# Patient Record
Sex: FEMALE | Race: WHITE
[De-identification: names, ages, dates, MRNs, and addresses within clinical notes are randomized per-mention and may not be internally consistent; named-entity substitution may affect disease eponyms.]

---

## 2021-10-06 ENCOUNTER — HOSPITAL ENCOUNTER (OUTPATIENT)
Dept: HOSPITAL 63 - SURG | Age: 66
Discharge: HOME | End: 2021-10-06
Attending: ANESTHESIOLOGY
Payer: COMMERCIAL

## 2021-10-06 VITALS — DIASTOLIC BLOOD PRESSURE: 96 MMHG | SYSTOLIC BLOOD PRESSURE: 174 MMHG

## 2021-10-06 DIAGNOSIS — Z88.8: ICD-10-CM

## 2021-10-06 DIAGNOSIS — Z90.710: ICD-10-CM

## 2021-10-06 DIAGNOSIS — E66.01: ICD-10-CM

## 2021-10-06 DIAGNOSIS — I10: ICD-10-CM

## 2021-10-06 DIAGNOSIS — M54.16: Primary | ICD-10-CM

## 2021-10-06 DIAGNOSIS — Z79.82: ICD-10-CM

## 2021-10-06 DIAGNOSIS — M48.061: ICD-10-CM

## 2021-10-06 DIAGNOSIS — Z71.82: ICD-10-CM

## 2021-10-06 DIAGNOSIS — Z98.890: ICD-10-CM

## 2021-10-06 DIAGNOSIS — Z79.899: ICD-10-CM

## 2021-10-06 PROCEDURE — G0463 HOSPITAL OUTPT CLINIC VISIT: HCPCS

## 2021-10-06 PROCEDURE — 99214 OFFICE O/P EST MOD 30 MIN: CPT

## 2021-10-20 ENCOUNTER — HOSPITAL ENCOUNTER (OUTPATIENT)
Dept: HOSPITAL 63 - SURG | Age: 66
Discharge: HOME | End: 2021-10-20
Attending: ANESTHESIOLOGY
Payer: COMMERCIAL

## 2021-10-20 VITALS — DIASTOLIC BLOOD PRESSURE: 84 MMHG | SYSTOLIC BLOOD PRESSURE: 182 MMHG

## 2021-10-20 DIAGNOSIS — Z79.899: ICD-10-CM

## 2021-10-20 DIAGNOSIS — Z79.82: ICD-10-CM

## 2021-10-20 DIAGNOSIS — Z88.8: ICD-10-CM

## 2021-10-20 DIAGNOSIS — M54.16: Primary | ICD-10-CM

## 2021-10-20 PROCEDURE — 64483 NJX AA&/STRD TFRM EPI L/S 1: CPT

## 2021-11-04 ENCOUNTER — HOSPITAL ENCOUNTER (OUTPATIENT)
Dept: HOSPITAL 63 - SURG | Age: 66
Discharge: HOME | End: 2021-11-04
Attending: ANESTHESIOLOGY
Payer: COMMERCIAL

## 2021-11-04 VITALS — DIASTOLIC BLOOD PRESSURE: 75 MMHG | SYSTOLIC BLOOD PRESSURE: 139 MMHG

## 2021-11-04 DIAGNOSIS — Z79.899: ICD-10-CM

## 2021-11-04 DIAGNOSIS — M19.90: ICD-10-CM

## 2021-11-04 DIAGNOSIS — Z98.890: ICD-10-CM

## 2021-11-04 DIAGNOSIS — E66.01: ICD-10-CM

## 2021-11-04 DIAGNOSIS — M54.59: Primary | ICD-10-CM

## 2021-11-04 DIAGNOSIS — Z90.710: ICD-10-CM

## 2021-11-04 DIAGNOSIS — Z90.49: ICD-10-CM

## 2021-11-04 DIAGNOSIS — M48.061: ICD-10-CM

## 2021-11-04 PROCEDURE — G0463 HOSPITAL OUTPT CLINIC VISIT: HCPCS

## 2021-11-04 PROCEDURE — 99214 OFFICE O/P EST MOD 30 MIN: CPT

## 2021-11-23 ENCOUNTER — HOSPITAL ENCOUNTER (OUTPATIENT)
Dept: HOSPITAL 61 - KCIC | Age: 66
Discharge: HOME | End: 2021-11-23
Attending: NEUROLOGICAL SURGERY
Payer: COMMERCIAL

## 2021-11-23 DIAGNOSIS — M25.78: ICD-10-CM

## 2021-11-23 DIAGNOSIS — M48.061: Primary | ICD-10-CM

## 2021-11-23 DIAGNOSIS — M41.86: ICD-10-CM

## 2021-11-23 DIAGNOSIS — Z88.8: ICD-10-CM

## 2021-11-23 DIAGNOSIS — Z79.84: ICD-10-CM

## 2021-11-23 DIAGNOSIS — Z79.899: ICD-10-CM

## 2021-11-23 DIAGNOSIS — M47.26: ICD-10-CM

## 2021-11-23 PROCEDURE — 62304 MYELOGRAPHY LUMBAR INJECTION: CPT

## 2021-11-23 PROCEDURE — 72132 CT LUMBAR SPINE W/DYE: CPT

## 2021-11-23 NOTE — [facility]
EXAM: Fluoroscopic guided lumbar puncture for CT myelography; lumbar spine CT myelogram.



HISTORY: Lumbar stenosis. Left greater than right lower extremity radiculopathy..



TECHNIQUE: The risks of the procedure discussed with the patient and written and consent was obtained
.. Timeout was performed. Fluoroscopic imaging of the lumbar spine was performed and a site overlying
 the L2-L3 was selected for needle entry. The skin in this location was sterilely prepped, draped and
 infiltrated with 1 percent lidocaine. A spinal needle was advanced into the thecal sac spinal needle
 was advanced into the thecal sac. 12 cc of Omnipaque 180 intrathecal contrast was injected. The need
le was removed and a sterile bandage was placed at the needle entry site. Prone and standing upright 
neutral, flexion and extension fluoroscopic images were obtained. The patient was then transferred to
 the CT suite for the post injection CT portion of the exam. The patient was discharged in stable con
dition without immediate complication. 3 fluoroscopic spot images were obtained for a total fluorosco
py time 39 seconds.

 

*One or more of the following individualized dose reduction techniques were utilized for this examina
tion:  

1. Automated exposure control.  

2. Adjustment of the mA and/or kV according to patient size.  

3. Use of iterative reconstruction technique.



COMPARISON: None.



FINDINGS: There is mild lumbar dextroscoliosis centered at L3. There is a 1 mL retrolisthesis of L2 o
n L3 and 3 mm retrolisthesis of L3 on L4 and L4 and L5. There is degenerative endplate remodeling and
 osteophytosis primarily at L3-L4 and L4-L5. There is disc space narrowing predominantly along the ri
ght aspect of L3-L4 and bilaterally at all 4 to L5. There is vacuum phenomenon at these levels. There
 is also vacuum phenomenon anteriorly at L1-L2. There are multiple endplate Schmorl's nodes. There is
 suspected bone demineralization. There is no acute or subacute fracture. There is no suspicious osse
ous lesion. The right kidney is absent. There is aortobiiliac calcified atherosclerotic plaque. There
 is mild vacuum phenomenon involving the sacroiliac joints. The conus terminates at the inferior aspe
ct of L1.



At L1-L2, there is right anterior predominant endplate remodeling. There is mild-to-moderate right an
d mild left facet arthropathy. There is no stenosis.



At L2-L3, there is a disc bulge and anterior predominant endplate remodeling. There is mild to modera
te right and mild left facet arthropathy. There are partial laminectomy changes. There is slight retr
olisthesis. There is no stenosis.



At L3-L4, there is a broad-based left lateral recess to foraminal disc protrusion and slight inferior
 extrusion. There is also a shallow broad-based posterior central disc protrusion and slight inferior
 extrusion. These are superimposed on a disc bulge and endplate remodeling. There is moderate to shon
re bilateral facet arthropathy. There are partial laminectomy changes. There is mild retrolisthesis. 
There is mild right and severe left foraminal stenosis. There is mild to moderate central canal steno
sis.



At L4-L5, there are left greater than right lateral recess to extraforaminal disc protrusions and sli
ght superior extrusions with osteophyte complexes. These are superimposed on a disc bulge and endplat
e remodeling. There is mild right facet arthropathy. There is mild retrolisthesis. There is moderate 
right and severe left foraminal stenosis. There is moderate to severe central canal stenosis.



At L5-S1, there is a disc bulge and endplate remodeling. There is moderate bilateral facet arthropath
y. There is no stenosis.



IMPRESSION:

1. Multilevel degenerative change involving the lumbar spine, described in detail above. This results
 in mild right and severe left foraminal and mild to moderate central canal stenosis at L3-L4 and mod
erate right and severe left foraminal and moderate to severe central canal stenosis at L4-L5.

2. Lumbar scoliosis and slight listhesis at the aforementioned levels.

3. Partial laminectomy changes at L2-L3 and L3-L4.



Electronically signed by: Aracely Norris MD (11/23/2021 2:02 PM) [id number]

## 2021-11-23 NOTE — [facility]
EXAM: Fluoroscopic guided lumbar puncture for CT myelography; lumbar spine CT myelogram.



HISTORY: Lumbar stenosis. Left greater than right lower extremity radiculopathy..



TECHNIQUE: The risks of the procedure discussed with the patient and written and consent was obtained
.. Timeout was performed. Fluoroscopic imaging of the lumbar spine was performed and a site overlying
 the L2-L3 was selected for needle entry. The skin in this location was sterilely prepped, draped and
 infiltrated with 1 percent lidocaine. A spinal needle was advanced into the thecal sac spinal needle
 was advanced into the thecal sac. 12 cc of Omnipaque 180 intrathecal contrast was injected. The need
le was removed and a sterile bandage was placed at the needle entry site. Prone and standing upright 
neutral, flexion and extension fluoroscopic images were obtained. The patient was then transferred to
 the CT suite for the post injection CT portion of the exam. The patient was discharged in stable con
dition without immediate complication. 3 fluoroscopic spot images were obtained for a total fluorosco
py time 39 seconds.

 

*One or more of the following individualized dose reduction techniques were utilized for this examina
tion:  

1. Automated exposure control.  

2. Adjustment of the mA and/or kV according to patient size.  

3. Use of iterative reconstruction technique.



COMPARISON: None.



FINDINGS: There is mild lumbar dextroscoliosis centered at L3. There is a 1 mL retrolisthesis of L2 o
n L3 and 3 mm retrolisthesis of L3 on L4 and L4 and L5. There is degenerative endplate remodeling and
 osteophytosis primarily at L3-L4 and L4-L5. There is disc space narrowing predominantly along the ri
ght aspect of L3-L4 and bilaterally at all 4 to L5. There is vacuum phenomenon at these levels. There
 is also vacuum phenomenon anteriorly at L1-L2. There are multiple endplate Schmorl's nodes. There is
 suspected bone demineralization. There is no acute or subacute fracture. There is no suspicious osse
ous lesion. The right kidney is absent. There is aortobiiliac calcified atherosclerotic plaque. There
 is mild vacuum phenomenon involving the sacroiliac joints. The conus terminates at the inferior aspe
ct of L1.



At L1-L2, there is right anterior predominant endplate remodeling. There is mild-to-moderate right an
d mild left facet arthropathy. There is no stenosis.



At L2-L3, there is a disc bulge and anterior predominant endplate remodeling. There is mild to modera
te right and mild left facet arthropathy. There are partial laminectomy changes. There is slight retr
olisthesis. There is no stenosis.



At L3-L4, there is a broad-based left lateral recess to foraminal disc protrusion and slight inferior
 extrusion. There is also a shallow broad-based posterior central disc protrusion and slight inferior
 extrusion. These are superimposed on a disc bulge and endplate remodeling. There is moderate to shon
re bilateral facet arthropathy. There are partial laminectomy changes. There is mild retrolisthesis. 
There is mild right and severe left foraminal stenosis. There is mild to moderate central canal steno
sis.



At L4-L5, there are left greater than right lateral recess to extraforaminal disc protrusions and sli
ght superior extrusions with osteophyte complexes. These are superimposed on a disc bulge and endplat
e remodeling. There is mild right facet arthropathy. There is mild retrolisthesis. There is moderate 
right and severe left foraminal stenosis. There is moderate to severe central canal stenosis.



At L5-S1, there is a disc bulge and endplate remodeling. There is moderate bilateral facet arthropath
y. There is no stenosis.



IMPRESSION:

1. Multilevel degenerative change involving the lumbar spine, described in detail above. This results
 in mild right and severe left foraminal and mild to moderate central canal stenosis at L3-L4 and mod
erate right and severe left foraminal and moderate to severe central canal stenosis at L4-L5.

2. Lumbar scoliosis and slight listhesis at the aforementioned levels.

3. Partial laminectomy changes at L2-L3 and L3-L4.



Electronically signed by: Aracely Norris MD (11/23/2021 2:02 PM) [id number]

## 2021-12-14 ENCOUNTER — HOSPITAL ENCOUNTER (OUTPATIENT)
Dept: HOSPITAL 61 - PNCL | Age: 66
Discharge: HOME | End: 2021-12-14
Attending: ANESTHESIOLOGY
Payer: COMMERCIAL

## 2021-12-14 DIAGNOSIS — Z87.891: ICD-10-CM

## 2021-12-14 DIAGNOSIS — E11.9: ICD-10-CM

## 2021-12-14 DIAGNOSIS — Z79.82: ICD-10-CM

## 2021-12-14 DIAGNOSIS — M79.604: ICD-10-CM

## 2021-12-14 DIAGNOSIS — Z83.3: ICD-10-CM

## 2021-12-14 DIAGNOSIS — Z88.8: ICD-10-CM

## 2021-12-14 DIAGNOSIS — Z79.84: ICD-10-CM

## 2021-12-14 DIAGNOSIS — Z79.899: ICD-10-CM

## 2021-12-14 DIAGNOSIS — Z90.49: ICD-10-CM

## 2021-12-14 DIAGNOSIS — Z82.49: ICD-10-CM

## 2021-12-14 DIAGNOSIS — M54.50: Primary | ICD-10-CM

## 2021-12-14 DIAGNOSIS — Z98.890: ICD-10-CM

## 2021-12-14 DIAGNOSIS — M19.90: ICD-10-CM

## 2021-12-14 DIAGNOSIS — E66.9: ICD-10-CM

## 2021-12-14 DIAGNOSIS — Z90.710: ICD-10-CM

## 2021-12-14 PROCEDURE — G0463 HOSPITAL OUTPT CLINIC VISIT: HCPCS

## 2021-12-14 NOTE — PDOC1
INITIAL PAIN CONSULT


DATE OF SERVICE:


DOS:


DATE: 12/14/21 


TIME: 12:49





CHIEF COMPLAINT:


Chief Complaint:


Low back and left greater than right lower extremity pain





HISTORY OF PRESENT ILLNESS:


65-year-old female presents history of pain low back bilateral lower extremities

left greater than right for about 1 year not result of any specific injury or 

accident but slowly worsening over the year with standing walking sitting for 

longer than 10 to 15 minutes patient reports pain is waking her from sleep at 

night occasionally but not every night does not affect her bowel bladder control

but does affect her ability to walk she occasionally uses a walker but does not 

have one with her today.  Patient has had previous lumbar surgery with 

decompression at L2-3 and L3-4 in 1995 with good resolution of pain at that time

but the pain over the past year is returned significant patient is recently seen

her neurosurgeon which is not recommending any surgery at this time.  Patient 

tried Excedrin as well as hydrocodone both of which decrease the pain about 20 

to 30% also had physical therapy in June of this year which is not helpful and 

has had at one epidural injection in October of this year at an outside facility

which was not helpful by her report as well.  Patient rates her disability 

rating 0-10 10 being the worst is a 10 in all categories except for life support

activities and sleeping which is a 7 self-care sexual history occupation social 

related recreation family activities are all rated at 10/10./Patient did have an

MRI scan and a CT myelogram showing multilevel degenerative change with mild 

right and severe left foraminal and mild to moderate central canal stenosis at L

3-4 moderate right and severe left foraminal and moderate to severe central 

canal stenosis at L4-5 lumbar scoliosis and slight listhesis at the 

aforementioned levels with partial laminectomy changes L2-3 and at L3-4.  

Patient describes the pain as constant sharp stabbing radiating down the lower 

extremities mostly the posterior gluteus posterior lateral thigh on the left 

greater than right but present bilaterally with some numbness and tingling on 

the left lateral thigh as well.  Patient reports no loss of motor function but 

significant fatigability of the lower extremities.





PAST MEDICAL HISTORY:


PMH:


Dizziness, arthritis, diabetes, cigarette smoking





PREVIOUS SURGERIES:


Past Surgical Hx:


Hysterectomy, lumbar laminectomy, cholecystectomy, nephrectomy





CURRENT MEDICATIONS:


Current Meds:





Active Scripts








 Medications  Dose


 Route/Sig


 Max Daily Dose Days Date Category


 


 Gabapentin **


  (Gabapentin) 400


 Mg Capsule  400 Mg


 PO HS


   12/14/21 Reported


 


 Gabapentin **


  (Gabapentin) 100


 Mg Capsule  200 Mg


 PO DAILY


   12/14/21 Reported


 


 Meloxicam 7.5 Mg


 Tablet  7.5 Mg


 PO BID


   12/14/21 Reported


 


 Excedrin Migraine


 Geltab


  (Aspirin/Acetaminophen/Caffeine)


 1 Each Tablet  1 Each


 PO QID


   12/14/21 Reported


 


 Metformin Hcl 500


 Mg Tablet  500 Mg


 PO BIDWMEALS


   11/23/21 Reported


 


 Omeprazole 20 Mg


 Capsule.dr  1 Cap


 PO DAILY


   11/23/21 Reported


 


 Mirapex


  (Pramipexole


 Di-Hcl) 0.25 Mg


 Tablet  1 Tab


 PO QHS


   11/23/21 Reported











ALLERGIES;


Allergies:  


Coded Allergies:  


     meperidine (Verified  Allergy, Unknown, 11/23/21)





FAMILY HISTORY:


Family Hx:


Alzheimer's disease, diabetes, hypertension





SOCIAL HISTORY:


Social Hx:


Patient does not drink alcohol and smokes less than half pack cigarettes and has

for 45 years continues to smoke, does not use any illegal illicit or 

recreational drugs is single lives locally in [facility]





REVIEW OF SYSTEMS:


ROS:


Positive for those items mentioned in history of present illness, all systems 

are reviewed, otherwise negative ,and are complete full and well-documented on 

patient's chart.





PHYSICAL EXAM:


VS:


Blood pressure is 142/95 pulse 111 respirations 18 temperature 98.1 F height is

5 foot 3 inches weight is 328 pounds


PE:


PHYSICAL EXAMINATION:





GENERAL: The patient is awake, alert, oriented, appropriate, very pleasant in 

demeanor


HEENT: Shows normocephalic, atraumatic.  Extraocular movements are intact and 

symmetrical.  Oral cavity: Mucous membranes moist and pink.  Dentition is 

intact.


NECK: Shows anterior throat supple without palpable lymphadenopathy noted.  

Swallow reflex symmetrical.


CHEST: Shows normal on inspection.  Breath sounds are clear bilaterally, distant

but no rales or rhonchi bilaterally.


HEART: Shows S1, S2 clear.  No murmurs auscultated.


ABDOMEN: Soft, nontender, nondistended, obese.  No palpable organomegaly is 

noted.


BACK: Shows spine grossly in the midline.  Normal-appearing cervical lordotic 

curvature.  There is slightly increased thoracic kyphosis, some flattening of 

the lumbar lordotic curvature, with well-healed surgical scarring noted.  Lumbar

paraspinous muscles show symmetrical on inspection, on palpation shows some 

moderate tenderness diffusely throughout the upper, middle and lower 

distribution of the paraspinous muscles bilaterally and also into the lower 

thoracic paraspinous musculature, firm and tender, but without specific trigger 

points, without radiation of pain.  The patient has good rotational motion of 

the lumbar spine, both laterally as well as extension and flexion with mild 

tenderness with extension and forward flexion as well as right and left lateral 

rotation.  No tenderness over the spinous processes, sacrum or sacroiliac 

regions.


EXTREMITIES: Lower extremities show deep tendon reflexes 1+ in the patellar and 

tendo calcaneus tendons.  Motor exam is 5 on a scale of 5 with right 

dorsiflexion, extension, quadriceps and hamstring flexion and 4/5 on the left.  

Peripheral pulses are 1+ posterior tibial.  No peripheral edema is noted 

bilaterally.  Lower extremities are warm and dry to touch, equal in color and 

appearance.  Patient is able to stand but has difficulty trying to stand on her 

toes walks with a slight favoring gait does appear to favor the left lower 

extremity but not use any assistive devices with her on her visit today.


SKIN: Shows warm and dry, good turgor.  No edema.  No sores, rashes or bruising 

throughout.





IMPRESSION:


Impression:


65-year-old female with approximate 1 year history increasing pain low back left

greater than right lower extremity radicular fashion status post physical 

therapy as well as interventional techniques without significant improvement.


CT myelogram as noted


Obesity


Diabetes


Arthritis


Cigarette smoking





Plan: Options were discussed with patient including continued physical therapies

interventional techniques and medication management.  Patient would like to 

pursue a most conservative course we will order physical therapy with traction 

for the lumbar spine also discussed water therapy following this if not 

significantly reducing the pain.  Patient was given information regarding spinal

cord stimulation as well.  Patient will follow up after physical therapy.











JANIE ROCKWELL MD               Dec 14, 2021 12:55

## 2022-01-31 ENCOUNTER — HOSPITAL ENCOUNTER (OUTPATIENT)
Dept: HOSPITAL 61 - PNCL | Age: 67
Discharge: HOME | End: 2022-01-31
Attending: ANESTHESIOLOGY
Payer: COMMERCIAL

## 2022-01-31 DIAGNOSIS — M96.1: ICD-10-CM

## 2022-01-31 DIAGNOSIS — M48.061: ICD-10-CM

## 2022-01-31 DIAGNOSIS — Z79.84: ICD-10-CM

## 2022-01-31 DIAGNOSIS — M51.16: Primary | ICD-10-CM

## 2022-01-31 DIAGNOSIS — Z79.899: ICD-10-CM

## 2022-01-31 DIAGNOSIS — Z79.82: ICD-10-CM

## 2022-01-31 PROCEDURE — G0463 HOSPITAL OUTPT CLINIC VISIT: HCPCS

## 2022-01-31 PROCEDURE — 99212 OFFICE O/P EST SF 10 MIN: CPT

## 2022-01-31 NOTE — PDOC
Progress Note - Pain Clinic


Date of Service:


DOS:


DATE: 1/31/22 


TIME: 12:46





Diagnosis:


Dx:


Lumbar radiculopathy with lumbar degenerative disease and lumbar spinal stenosis

with lumbar postlaminectomy syndrome





History or Present Illness:


HPI:


66-year-old female returns for follow-up status post physical therapy which she 

is completed now with traction reports improvement by about 50% she feels with 

physical therapy although is beginning to return now that therapy is ended the 

traction was helpful in decreasing pain that was getting into her left lower 

extremity primarily still has some in that area but more is just in the low back

now and into the legs occasionally patient reports is an 8 on scale 10 is worst 

average and a 6 at its least and is a 6 today.  Patient reports worse with 

walking standing change positions once again with radiating pain more on the 

left leg but less pronounced than prior to the traction.  Patient reports is a 

sharp pain in the back radiating can be constant and severe with walking 

standing or standing for prolonged periods greater than 15 to 20 minutes.  

Patient reports is better with sitting or lying down but is still waking her 

from sleep about every 4-6 hours.  Patient reports she has been doing household 

activities with greater ease and comfort since the traction was performed but 

again the pain is still fairly significant.  Patient reports no bowel or bladder

incontinence.





Physical Exam:


VS:


Blood pressures 142/84 pulse 107 respirations 18 temperature 98.3 F weight is 

329 pounds.


PE:


PHYSICAL EXAMINATION:





GENERAL: The patient is awake, alert, oriented, appropriate, very pleasant in 

demeanor


HEENT: Shows normocephalic, atraumatic.  Extraocular movements are intact and 

symmetrical.  Oral cavity: Mucous membranes moist and pink.  Dentition is 

intact.


NECK: Shows anterior throat supple without palpable lymphadenopathy noted.  

Swallow reflex symmetrical.


CHEST: Shows normal on inspection.  Breath sounds are clear bilaterally, distant

but no rales or rhonchi.


HEART: Shows S1, S2 clear.  No murmurs auscultated.


ABDOMEN: Soft, nontender, nondistended.  No palpable organomegaly is noted.  


BACK: Shows spine grossly in the midline.  Normal-appearing cervical lordotic 

curvature.  There is slightly increased thoracic kyphosis, some minor flattening

of the lumbar lordotic curvature.  Lumbar paraspinous muscles show symmetrical 

on inspection, on palpation shows some moderate tenderness diffusely throughout 

the upper, middle and lower distribution of the paraspinous muscles, but without

specific trigger points, without radiation of pain.  The patient has good 

rotational motion of the lumbar spine, both laterally as well as extension and 

flexion without significant difficulty.  No tenderness over the spinous 

processes, has some moderate tenderness on the left side over the posterior 

superior iliac spine and the superior aspect of the sacroiliac joint but without

radiation, right side is nontender.


EXTREMITIES: Lower extremities show deep tendon reflexes 1+ in the patellar and 

tendo calcaneus tendons.  Motor exam is 5 on a scale of 5 with right 

dorsiflexion, extension, quadriceps and hamstring flexion and 4/5 on the left.  

Peripheral pulses are 1 posterior tibial.  No peripheral edema is noted 

bilaterally.  Lower extremities are warm and dry.


SKIN: Shows warm and dry, good turgor.  No edema.  No sores, rashes or bruising 

throughout.





Procedure:


Procedure:


Options were discussed with the patient.  Patient's old chart was reviewed as 

her current medication regimen updated current review of systems updated today 

as well.  We will enroll patient for water therapy and patient has a community 

center near her home which is very affordable and has been through some water 

programs there in the distant past and would like to utilize this for water 

aerobics, and conditioning.  We discussed that the patient will follow up in 

approximate 4 weeks after water therapy is completed also encouraged pressure 

and trigger massage to the left lumbar paraspinous musculature as well as the 

sacroiliac region and showed patient some stretching and strengthening exercises

to do to specifically focus on this area of the back.  Patient will follow up on

some water therapy is completed.





Medication Injected:


Med Injected:


None





Condition at Discharge:


Condition at Discharge:


Condition at discharge is stable.











JANIE ROCKWELL MD               Jan 31, 2022 12:50

## 2022-04-20 ENCOUNTER — HOSPITAL ENCOUNTER (OUTPATIENT)
Dept: HOSPITAL 61 - PNCL | Age: 67
Discharge: HOME | End: 2022-04-20
Attending: ANESTHESIOLOGY
Payer: COMMERCIAL

## 2022-04-20 DIAGNOSIS — Z79.82: ICD-10-CM

## 2022-04-20 DIAGNOSIS — M51.16: Primary | ICD-10-CM

## 2022-04-20 DIAGNOSIS — Z88.8: ICD-10-CM

## 2022-04-20 DIAGNOSIS — Z79.899: ICD-10-CM

## 2022-04-20 DIAGNOSIS — M48.061: ICD-10-CM

## 2022-04-20 PROCEDURE — 99212 OFFICE O/P EST SF 10 MIN: CPT

## 2022-04-20 PROCEDURE — G0463 HOSPITAL OUTPT CLINIC VISIT: HCPCS

## 2022-04-20 NOTE — PDOC
Progress Note - Pain Clinic


Date of Service:


DOS:


DATE: 4/20/22 


TIME: 09:22





Diagnosis:


Dx:


Lumbar radiculopathy with lumbar degenerative disease and lumbar spinal stenosis





History or Present Illness:


HPI:


66-year-old female returns status post initial evaluation and physical therapy 

with water therapy patient reports she did this and while she felt good doing 

therapy whilst in the [place] she gets that the pain returns in the low back 

and bilateral lower extremities posterior gluteus posterior thighs lateral 

thighs anterior thighs medial thighs medial lower legs patient reports is a 9 on

scale 10 is worse over the past week 8 on average 6 its least and is a 8 today. 

Patient reports still significant pain in the low back bilateral lower 

extremities he has had multiple interventional procedures at an outside facility

with epidural steroid injections nerve blocks and other standard physical 

therapies as well as recent water therapy here currently.  Patient reports has 

had traction which helps during the traction but not afterwards as well.  

Patient is seen her neurosurgeon who is not recommending surgery but did 

recommend spinal cord stimulator trial and we decided to try the water therapy 

first prior to evaluating for this.  Patient reports pain is about the same and 

no significant improvement pain in the low back radiating to bilateral lower 

extremities aching in the low back constant severe worse with walking standing 

changing positions better with sitting or laying down generally does not awaken 

her from sleep at night when she is up in the morning the pain is returned 

fairly significantly and fairly suddenly.  Patient reports bladder incontinence.

 Patient continues with anti-inflammatory medications as well as Tylenol without

significant improvement as well.  Patient has had tried muscle relaxants in the 

past as well as narcotic analgesics all with only minimal decrease in pain.





Physical Exam:


VS:


Blood pressure is 137/91 pulse 121 respirations 18 temperature is 90.5 F height

5 feet 7 inches weight 327 pounds.


PE:


PHYSICAL EXAMINATION:





GENERAL: The patient is awake, alert, oriented, appropriate, very pleasant in 

demeanor


HEENT: Shows normocephalic, atraumatic.  Extraocular movements are intact and 

symmetrical.  Oral cavity: Mucous membranes moist and pink.  Dentition is 

intact.


NECK: Shows anterior throat supple without palpable lymphadenopathy noted.  

Swallow reflex symmetrical.


CHEST: Shows normal on inspection.  Breath sounds are clear bilaterally, distant

but no rales or rhonchi auscultated.


HEART: Shows S1, S2 clear.  No murmurs auscultated.


ABDOMEN: Soft, nontender, nondistended.  No palpable organomegaly is noted.


BACK: Shows spine grossly in the midline.  Normal-appearing cervical lordotic 

curvature.  There is mildly increased thoracic kyphosis, some moderate 

flattening of the lumbar lordotic curvature.  Lumbar paraspinous muscles show 

symmetrical on inspection, on palpation shows some moderate tenderness diffusely

throughout the upper, middle and lower distribution of the paraspinous muscles 

without specific trigger points, without radiation of pain.  The patient has 

good rotational motion of the lumbar spine, both laterally as well as extension 

and flexion without significant difficulty. 


EXTREMITIES: Lower extremities show deep tendon reflexes 1+ in the patellar and 

tendo calcaneus tendons.  Motor exam is 5 on a scale of 5 with right d

orsiflexion, extension, quadriceps and hamstring flexion and 4/5 on the left.  

Peripheral pulses are 1 posterior tibial.  No peripheral edema is noted 

bilaterally.  Lower extremities are warm and dry.


SKIN: Shows warm and dry, good turgor.  No edema.  No sores, rashes or bruising 

throughout.





Procedure:


Procedure:


Options were discussed with the patient.  Patient's old chart was reviewed as 

her current medication regimen updated current review of systems updated today 

as well.  Patient with persistent lumbar radiculopathy status post multiple 

physical therapies, traction, water therapy, interventional techniques, 

medication management without significant improvement.  Neurosurgical evaluation

without surgery recommendations and recommendations for spinal cord stimulation.

 We will preauthorize patient for spinal cord stimulator temporary leads 

placement.  Patient will have psychiatric evaluation prior and wants cleared 

from this will preauthorize patient for a spinal cord stimulator trial 

placement.





Medication Injected:


Med Injected:


None





Condition at Discharge:


Condition at Discharge:


Condition at discharge is stable.











JANIE ROCKWELL MD               Apr 20, 2022 09:27